# Patient Record
Sex: FEMALE | Race: WHITE | Employment: FULL TIME | URBAN - METROPOLITAN AREA
[De-identification: names, ages, dates, MRNs, and addresses within clinical notes are randomized per-mention and may not be internally consistent; named-entity substitution may affect disease eponyms.]

---

## 2018-01-08 ENCOUNTER — OP HISTORICAL/CONVERTED ENCOUNTER (OUTPATIENT)
Dept: OTHER | Age: 58
End: 2018-01-08

## 2018-02-06 ENCOUNTER — APPOINTMENT (OUTPATIENT)
Dept: MRI IMAGING | Age: 58
DRG: 446 | End: 2018-02-06
Attending: INTERNAL MEDICINE
Payer: OTHER GOVERNMENT

## 2018-02-06 ENCOUNTER — HOSPITAL ENCOUNTER (INPATIENT)
Age: 58
LOS: 1 days | Discharge: HOME | DRG: 446 | End: 2018-02-07
Attending: EMERGENCY MEDICINE | Admitting: INTERNAL MEDICINE
Payer: OTHER GOVERNMENT

## 2018-02-06 ENCOUNTER — APPOINTMENT (OUTPATIENT)
Dept: CT IMAGING | Age: 58
DRG: 446 | End: 2018-02-06
Attending: EMERGENCY MEDICINE
Payer: OTHER GOVERNMENT

## 2018-02-06 DIAGNOSIS — K80.50 CHOLEDOCHOLITHIASIS: Primary | ICD-10-CM

## 2018-02-06 LAB
ALBUMIN SERPL-MCNC: 3.5 G/DL (ref 3.5–5)
ALBUMIN/GLOB SERPL: 1.1 {RATIO} (ref 1.1–2.2)
ALP SERPL-CCNC: 70 U/L (ref 45–117)
ALT SERPL-CCNC: 21 U/L (ref 12–78)
ANION GAP SERPL CALC-SCNC: 11 MMOL/L (ref 5–15)
APPEARANCE UR: CLEAR
AST SERPL-CCNC: 27 U/L (ref 15–37)
BASOPHILS # BLD: 0.1 K/UL (ref 0–0.1)
BASOPHILS NFR BLD: 1 % (ref 0–1)
BILIRUB SERPL-MCNC: 0.3 MG/DL (ref 0.2–1)
BILIRUB UR QL: NEGATIVE
BUN SERPL-MCNC: 14 MG/DL (ref 6–20)
BUN/CREAT SERPL: 18 (ref 12–20)
CALCIUM SERPL-MCNC: 9.1 MG/DL (ref 8.5–10.1)
CHLORIDE SERPL-SCNC: 106 MMOL/L (ref 97–108)
CO2 SERPL-SCNC: 26 MMOL/L (ref 21–32)
COLOR UR: ABNORMAL
CREAT SERPL-MCNC: 0.78 MG/DL (ref 0.55–1.02)
DIFFERENTIAL METHOD BLD: ABNORMAL
EOSINOPHIL # BLD: 0.2 K/UL (ref 0–0.4)
EOSINOPHIL NFR BLD: 1 % (ref 0–7)
ERYTHROCYTE [DISTWIDTH] IN BLOOD BY AUTOMATED COUNT: 13 % (ref 11.5–14.5)
GLOBULIN SER CALC-MCNC: 3.1 G/DL (ref 2–4)
GLUCOSE SERPL-MCNC: 118 MG/DL (ref 65–100)
GLUCOSE UR STRIP.AUTO-MCNC: NEGATIVE MG/DL
HCT VFR BLD AUTO: 41.4 % (ref 35–47)
HGB BLD-MCNC: 13.6 G/DL (ref 11.5–16)
HGB UR QL STRIP: NEGATIVE
IMM GRANULOCYTES # BLD: 0.1 K/UL (ref 0–0.04)
IMM GRANULOCYTES NFR BLD AUTO: 1 % (ref 0–0.5)
KETONES UR QL STRIP.AUTO: 15 MG/DL
LEUKOCYTE ESTERASE UR QL STRIP.AUTO: NEGATIVE
LIPASE SERPL-CCNC: 262 U/L (ref 73–393)
LYMPHOCYTES # BLD: 4.8 K/UL (ref 0.8–3.5)
LYMPHOCYTES NFR BLD: 44 % (ref 12–49)
MCH RBC QN AUTO: 30.7 PG (ref 26–34)
MCHC RBC AUTO-ENTMCNC: 32.9 G/DL (ref 30–36.5)
MCV RBC AUTO: 93.5 FL (ref 80–99)
MONOCYTES # BLD: 0.8 K/UL (ref 0–1)
MONOCYTES NFR BLD: 7 % (ref 5–13)
NEUTS SEG # BLD: 5.1 K/UL (ref 1.8–8)
NEUTS SEG NFR BLD: 46 % (ref 32–75)
NITRITE UR QL STRIP.AUTO: NEGATIVE
NRBC # BLD: 0 K/UL (ref 0–0.01)
NRBC BLD-RTO: 0 PER 100 WBC
PH UR STRIP: 6 [PH] (ref 5–8)
PLATELET # BLD AUTO: 317 K/UL (ref 150–400)
PMV BLD AUTO: 9.2 FL (ref 8.9–12.9)
POTASSIUM SERPL-SCNC: 3.7 MMOL/L (ref 3.5–5.1)
PROT SERPL-MCNC: 6.6 G/DL (ref 6.4–8.2)
PROT UR STRIP-MCNC: NEGATIVE MG/DL
RBC # BLD AUTO: 4.43 M/UL (ref 3.8–5.2)
SODIUM SERPL-SCNC: 143 MMOL/L (ref 136–145)
SP GR UR REFRACTOMETRY: <1.005 (ref 1–1.03)
TROPONIN I SERPL-MCNC: <0.04 NG/ML
UR CULT HOLD, URHOLD: NORMAL
UROBILINOGEN UR QL STRIP.AUTO: 0.2 EU/DL (ref 0.2–1)
WBC # BLD AUTO: 11 K/UL (ref 3.6–11)

## 2018-02-06 PROCEDURE — 93005 ELECTROCARDIOGRAM TRACING: CPT

## 2018-02-06 PROCEDURE — 80053 COMPREHEN METABOLIC PANEL: CPT | Performed by: EMERGENCY MEDICINE

## 2018-02-06 PROCEDURE — 83690 ASSAY OF LIPASE: CPT | Performed by: EMERGENCY MEDICINE

## 2018-02-06 PROCEDURE — 85025 COMPLETE CBC W/AUTO DIFF WBC: CPT | Performed by: EMERGENCY MEDICINE

## 2018-02-06 PROCEDURE — 74177 CT ABD & PELVIS W/CONTRAST: CPT

## 2018-02-06 PROCEDURE — 65270000029 HC RM PRIVATE

## 2018-02-06 PROCEDURE — 74011250636 HC RX REV CODE- 250/636: Performed by: EMERGENCY MEDICINE

## 2018-02-06 PROCEDURE — 96374 THER/PROPH/DIAG INJ IV PUSH: CPT

## 2018-02-06 PROCEDURE — 74181 MRI ABDOMEN W/O CONTRAST: CPT

## 2018-02-06 PROCEDURE — 84484 ASSAY OF TROPONIN QUANT: CPT | Performed by: EMERGENCY MEDICINE

## 2018-02-06 PROCEDURE — 81003 URINALYSIS AUTO W/O SCOPE: CPT | Performed by: EMERGENCY MEDICINE

## 2018-02-06 PROCEDURE — 36415 COLL VENOUS BLD VENIPUNCTURE: CPT | Performed by: EMERGENCY MEDICINE

## 2018-02-06 PROCEDURE — 99284 EMERGENCY DEPT VISIT MOD MDM: CPT

## 2018-02-06 PROCEDURE — 74011250636 HC RX REV CODE- 250/636: Performed by: INTERNAL MEDICINE

## 2018-02-06 PROCEDURE — 74011250637 HC RX REV CODE- 250/637: Performed by: INTERNAL MEDICINE

## 2018-02-06 PROCEDURE — 96361 HYDRATE IV INFUSION ADD-ON: CPT

## 2018-02-06 PROCEDURE — 74011636320 HC RX REV CODE- 636/320: Performed by: RADIOLOGY

## 2018-02-06 RX ORDER — DIPHENHYDRAMINE HCL 25 MG
25 CAPSULE ORAL
Status: DISCONTINUED | OUTPATIENT
Start: 2018-02-06 | End: 2018-02-07 | Stop reason: HOSPADM

## 2018-02-06 RX ORDER — ACETAMINOPHEN 325 MG/1
650 TABLET ORAL
Status: DISCONTINUED | OUTPATIENT
Start: 2018-02-06 | End: 2018-02-07 | Stop reason: HOSPADM

## 2018-02-06 RX ORDER — ONDANSETRON 2 MG/ML
4 INJECTION INTRAMUSCULAR; INTRAVENOUS
Status: COMPLETED | OUTPATIENT
Start: 2018-02-06 | End: 2018-02-06

## 2018-02-06 RX ORDER — NALOXONE HYDROCHLORIDE 0.4 MG/ML
0.4 INJECTION, SOLUTION INTRAMUSCULAR; INTRAVENOUS; SUBCUTANEOUS AS NEEDED
Status: DISCONTINUED | OUTPATIENT
Start: 2018-02-06 | End: 2018-02-07 | Stop reason: HOSPADM

## 2018-02-06 RX ORDER — ONDANSETRON 2 MG/ML
4 INJECTION INTRAMUSCULAR; INTRAVENOUS
Status: DISCONTINUED | OUTPATIENT
Start: 2018-02-06 | End: 2018-02-07 | Stop reason: HOSPADM

## 2018-02-06 RX ORDER — URSODIOL 300 MG/1
300 CAPSULE ORAL 2 TIMES DAILY WITH MEALS
Status: DISCONTINUED | OUTPATIENT
Start: 2018-02-06 | End: 2018-02-07 | Stop reason: HOSPADM

## 2018-02-06 RX ORDER — HYDROCODONE BITARTRATE AND ACETAMINOPHEN 5; 325 MG/1; MG/1
1 TABLET ORAL
Status: DISCONTINUED | OUTPATIENT
Start: 2018-02-06 | End: 2018-02-07 | Stop reason: HOSPADM

## 2018-02-06 RX ORDER — DEXTROSE, SODIUM CHLORIDE, AND POTASSIUM CHLORIDE 5; .45; .15 G/100ML; G/100ML; G/100ML
125 INJECTION INTRAVENOUS CONTINUOUS
Status: DISCONTINUED | OUTPATIENT
Start: 2018-02-06 | End: 2018-02-07 | Stop reason: HOSPADM

## 2018-02-06 RX ADMIN — ONDANSETRON 4 MG: 2 INJECTION INTRAMUSCULAR; INTRAVENOUS at 15:31

## 2018-02-06 RX ADMIN — SODIUM CHLORIDE 1000 ML: 900 INJECTION, SOLUTION INTRAVENOUS at 15:31

## 2018-02-06 RX ADMIN — URSODIOL 300 MG: 300 CAPSULE ORAL at 18:23

## 2018-02-06 RX ADMIN — IOPAMIDOL 100 ML: 755 INJECTION, SOLUTION INTRAVENOUS at 16:37

## 2018-02-06 RX ADMIN — DEXTROSE MONOHYDRATE, SODIUM CHLORIDE, AND POTASSIUM CHLORIDE 125 ML/HR: 50; 4.5; 1.49 INJECTION, SOLUTION INTRAVENOUS at 19:20

## 2018-02-06 NOTE — ED NOTES
TRANSFER - OUT REPORT:    Verbal report given to [facility] [place], RN(name) on Mary Locks  being transferred to Room 522(unit) for routine progression of care       Report consisted of patients Situation, Background, Assessment and   Recommendations(SBAR). Information from the following report(s) SBAR, Kardex, ED Summary, Procedure Summary, Intake/Output, MAR and Recent Results was reviewed with the receiving nurse. Lines:   Peripheral IV 02/06/18 Left Antecubital (Active)   Site Assessment Clean, dry, & intact 2/6/2018  2:49 PM   Phlebitis Assessment 0 2/6/2018  2:49 PM   Infiltration Assessment 0 2/6/2018  2:49 PM   Dressing Status Clean, dry, & intact 2/6/2018  2:49 PM   Dressing Type Transparent 2/6/2018  2:49 PM        Opportunity for questions and clarification was provided.       Patient transported with:   [employer]

## 2018-02-06 NOTE — IP AVS SNAPSHOT
[address] [place] [address] 104 1007 [facility] 
[phone] Patient: Moris Nunez MRN: [id number] :1960 About your hospitalization You were admitted on:  2018 You last received care in the:  [facility]  MED SURG 2 You were discharged on:  2018 Why you were hospitalized Your primary diagnosis was:  Bile Duct Stone Follow-up Information Follow up With Details Comments Contact Info Your PCP In 2 weeks Discharge Orders None A check stanley indicates which time of day the medication should be taken. My Medications Notice You have not been prescribed any medications. Discharge Instructions HOSPITALIST DISCHARGE INSTRUCTIONS 
NAME: Moris Nunez :  1960 MRN:  [id number] Date/Time:  2018 12:51 PM 
 
ADMIT DATE: 2018 DISCHARGE DATE: 2018 DISCHARGE DIAGNOSIS: 
Gallstone MEDICATIONS: 
· It is important that you take the medication exactly as they are prescribed. · Keep your medication in the bottles provided by the pharmacist and keep a list of the medication names, dosages, and times to be taken in your wallet. · Do not take other medications without consulting your doctor. Pain Management: per above medications What to do at [facility] Recommended diet:  Low fat, Low cholesterol Recommended activity: Activity as tolerated If you experience any of the following symptoms then please call your primary care physician or return to the emergency room if you cannot get hold of your doctor: 
Fever, chills, nausea, vomiting, diarrhea, change in mentation, falling, bleeding, shortness of breath Follow Up: Follow-up Information Follow up With Details Comments Contact Info Your PCP In 2 weeks Information obtained by : 
I understand that if any problems occur once I am at home I am to contact my physician. I understand and acknowledge receipt of the instructions indicated above. Physician's or R.N.'s Signature                                                                  Date/Time Patient or Representative Signature                                                          Date/Time Tengaged Announcement We are excited to announce that we are making your provider's discharge notes available to you in Tengaged. You will see these notes when they are completed and signed by the physician that discharged you from your recent hospital stay. If you have any questions or concerns about any information you see in Tengaged, please call the Health Information Department where you were seen or reach out to your Primary Care Provider for more information about your plan of care. Introducing [facility]! [facility] introduces Tengaged patient portal. Now you can access parts of your medical record, email your doctor's office, and request medication refills online. 1. In your internet browser, go to [email or web address] 2. Click on the First Time User? Click Here link in the Sign In box. You will see the New Member Sign Up page. 3. Enter your Tengaged Access Code exactly as it appears below. You will not need to use this code after youve completed the sign-up process. If you do not sign up before the expiration date, you must request a new code. · Tengaged Access Code: [email or web address] Expires: 5/8/2018  3:32 PM 
 
4. Enter the last four digits of your Social Security Number (xxxx) and Date of Birth (mm/dd/yyyy) as indicated and click Submit.  You will be taken to the next sign-up page. 5. Create a [employer] ID. This will be your [employer] login ID and cannot be changed, so think of one that is secure and easy to remember. 6. Create a [employer] password. You can change your password at any time. 7. Enter your Password Reset Question and Answer. This can be used at a later time if you forget your password. 8. Enter your e-mail address. You will receive e-mail notification when new information is available in [address] 9. Click Sign Up. You can now view and download portions of your medical record. 10. Click the Download Summary menu link to download a portable copy of your medical information. If you have questions, please visit the Frequently Asked Questions section of the [employer] website. Remember, [employer] is NOT to be used for urgent needs. For medical emergencies, dial 911. Now available from your iPhone and Android! Unresulted Labs-Please follow up with your PCP about these lab tests Order Current Status MRI ABD WO CONT Preliminary result Providers Seen During Your Hospitalization Provider Specialty Primary office phone Mansoor Gonzalez MD Emergency Medicine [phone] Ravi Munson MD Internal Medicine [phone] Sravan Peraza MD Internal Medicine [phone] Your Primary Care Physician (PCP) Primary Care Physician Office Phone Office Fax OTHER, PHYS ** None ** ** None ** You are allergic to the following No active allergies Recent Documentation Height Weight Breastfeeding? BMI OB Status Smoking Status 1.549 m 53.5 kg No 22.3 kg/m2 Postmenopausal Current Some Day Smoker Emergency Contacts Name Discharge Info Relation Home Work Mobile [employer] [place] CAREGIVER [3] Friend [5] [phone] Patient Belongings The following personal items are in your possession at time of discharge: Dental Appliances: None  Visual Aid: Glasses      Home Medications: None   Jewelry: Earrings, Ring, Necklace  Clothing: Footwear, Pants, Shirt, Sweater, Undergarments    Other Valuables: Courtney Holt Please provide this summary of care documentation to your next provider. Signatures-by signing, you are acknowledging that this After Visit Summary has been reviewed with you and you have received a copy. Patient Signature:  ____________________________________________________________ Date:  ____________________________________________________________  
  
Anh Rice Provider Signature:  ____________________________________________________________ Date:  ____________________________________________________________

## 2018-02-06 NOTE — PROGRESS NOTES
[facility]: MED RECONCILIATION    Comments/Recommendations:     Medication(s) ADDED to PTA list:  1. None    Medication(s) REMOVED from PTA list:  1. Estrogen, conjugated (Premarin) 0.45 mg daily- stopped years ago  2. Lortab prn    Medication(s) ADJUSTED on PTA list:  1. none      Information obtained from: Patient      Allergies: Review of patient's allergies indicates no known allergies.     Prior to Admission Medications:     None        SHIRA Ahn   Contact: [phone]

## 2018-02-06 NOTE — H&P
[facility]    Hospitalist Admission Note      NAME:  Connie Roberts   :   1960   MRN:  [id number]     PCP:  Jessi Ayers MD     Date/Time:  2018 5:39 PM          Subjective:     CHIEF COMPLAINT:  Abdominal pain    HISTORY OF PRESENT ILLNESS:     Ms. Zeeshan Zhang is a 61 y.o.  female who presented to the Emergency Department complaining of abdominal pain. Occurred for two days. Post prandial.  Similar to prior pain from gallbaldder, which was previously removed. ER workup with stones found in CBD and residual cystic ducts. She is on estrogens. We will admit her for management. Past Medical History:   Diagnosis Date    Liver disease 3/2012    lesion on liver/ just watching right now    Nausea & vomiting     severe         Past Surgical History:   Procedure Laterality Date    HX  SECTION  , ,     HX HYSTERECTOMY      uterus and other ovary    HX OOPHORECTOMY  1998     left    HX OTHER SURGICAL      tummy tuck       Social History   Substance Use Topics    Smoking status: Current Some Day Smoker     Packs/day: 0.50     Years: 30.00    Smokeless tobacco: Never Used    Alcohol use Yes      Comment: once every 2 to 3 months        History reviewed. No pertinent family history of gallstones. No Known Allergies     Prior to Admission medications    Medication Sig Start Date End Date Taking? Authorizing Provider   HYDROcodone-acetaminophen (LORTAB) 5-500 mg per tablet Take 1-2 Tabs by mouth. Romero Zabala MD   estrogens, conjugated, (PREMARIN) 0.45 mg tablet Take 0.45 mg by mouth daily.       Historical Provider       Review of Systems:  (bold if positive, if negative)    Gen:  Eyes:  ENT:  CVS:  Pulm:  GI:  Abdominal pain, nausea  :    MS:  Skin:  Psych:  Endo:    Hem:  Renal:    Neuro:        Objective:      VITALS:    Vital signs reviewed; most recent are:    Visit Vitals    BP 99/51 (BP 1 Location: Right arm, BP Patient Position: At rest)    Pulse 69    Temp 97.3 °F (36.3 °C)    Resp 18    Ht 5' 1\" (1.549 m)    Wt 53.5 kg (118 lb)    SpO2 100%    BMI 22.3 kg/m2     SpO2 Readings from Last 6 Encounters:   02/06/18 100%   05/11/12 99%        No intake or output data in the 24 hours ending 02/06/18 1517     Exam:     Physical Exam:    Gen:  Well-developed, well-nourished, in no acute distress  HEENT:  Pink conjunctivae, PERRL, hearing intact to voice, moist mucous membranes  Neck:  Supple, without masses, thyroid non-tender  Resp:  No accessory muscle use, clear breath sounds without wheezes rales or rhonchi  Card:  No murmurs, normal S1, S2 without thrills, bruits or peripheral edema  Abd:  Soft, minimally tender, non-distended, normoactive bowel sounds are present, no mass  Lymph:  No cervical or inguinal adenopathy  Musc:  No cyanosis or clubbing  Skin:  No rashes or ulcers, skin turgor is good  Neuro:  Cranial nerves are grossly intact, no focal motor weakness, follows commands appropriately  Psych:  Good insight, oriented to person, place and time, alert     Labs:    Recent Labs      02/06/18   1453   WBC  11.0   HGB  13.6   HCT  41.4   PLT  317     Recent Labs      02/06/18   1453   NA  143   K  3.7   CL  106   CO2  26   GLU  118*   BUN  14   CREA  0.78   CA  9.1   ALB  3.5   TBILI  0.3   SGOT  27   ALT  21     No results found for: GLUCPOC  No results for input(s): PH, PCO2, PO2, HCO3, FIO2 in the last 72 hours. No results for input(s): INR in the last 72 hours. No lab exists for component: INREXT  All Micro Results     Procedure Component Value Units Date/Time    URINE CULTURE HOLD SAMPLE [[id number]] Collected:  02/06/18 1711    Order Status:  Completed Specimen:  Serum Updated:  02/06/18 1730     Urine culture hold         URINE ON HOLD IN MICROBIOLOGY DEPT FOR 3 DAYS. IF UNPRESERVED URINE IS SUBMITTED, IT CANNOT BE USED FOR ADDITIONAL TESTING AFTER 24 HRS, RECOLLECTION WILL BE REQUIRED.           I have reviewed previous records       Assessment and Plan:      Bile duct stone - POA. Likely has high propensity for stones. Stop estrogens. Start ursodiol. Check MRCP, and then GI can determine whether to do ERCP for removal in AM.  NPO at midnight. Clears until then. Pain and nausea control. IVF.      Telemetry reviewed:   normal sinus rhythm    Risk of deterioration: low      Total time spent with patient: [address] discussed with: Patient, Family, Nursing Staff and >50% of time spent in counseling and coordination of care    Discussed:  Care Plan       ___________________________________________________    Attending Physician: Aries Spence MD

## 2018-02-06 NOTE — ED TRIAGE NOTES
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 Started with sharp abd pain upper abd, radiating to the back, with vomiting. Had episode like this yesterday, and vomited, then it resolved.

## 2018-02-06 NOTE — ED PROVIDER NOTES
Patient is a 61 y.o. female presenting with abdominal pain. Abdominal Pain    Associated symptoms include nausea, vomiting and back pain. Pertinent negatives include no fever, no diarrhea, no constipation, no dysuria, no hematuria and no headaches. 60 yo WF presents with epigastric abdominal pain, radiating to her back, onset just prior to arrival in ED. Pain 10/10, sharp, shooting. Associated with nausea and vomiting, nonbloody, nonbilious. Felt clammy. Had episode yesterday, milder pain yesterday, vomited x1 then symptoms resolved. Denies fever, chills, diarrhea, constipation. Last BM this morning, normal, no bloody or black stools. Denies dysuria, hematuria. Pt had her urethra stretched in January. No known sick contacts. Past Medical History:   Diagnosis Date    Liver disease 3/2012    lesion on liver/ just watching right now    Nausea & vomiting     severe        Past Surgical History:   Procedure Laterality Date    HX  SECTION  , , 80    HX HYSTERECTOMY      uterus and other ovary    HX OOPHORECTOMY       left    HX OTHER SURGICAL  2007    tummy tuck         History reviewed. No pertinent family history. Social History     Social History    Marital status:      Spouse name: N/A    Number of children: N/A    Years of education: N/A     Occupational History    Not on file. Social History Main Topics    Smoking status: Current Some Day Smoker     Packs/day: 0.50     Years: 30.00    Smokeless tobacco: Not on file    Alcohol use Yes      Comment: once every 2 to 3 months    Drug use: No    Sexual activity: Not on file     Other Topics Concern    Not on file     Social History Narrative         ALLERGIES: Review of patient's allergies indicates no known allergies. Review of Systems   Constitutional: Positive for diaphoresis. Negative for chills and fever. Respiratory: Negative for cough and shortness of breath.     Gastrointestinal: Positive for abdominal pain, nausea and vomiting. Negative for constipation and diarrhea. Genitourinary: Negative for dysuria and hematuria. Musculoskeletal: Positive for back pain. Skin: Negative for rash. Neurological: Negative for headaches. All other systems reviewed and are negative.       Vitals:    02/06/18 1429   BP: 138/64   Pulse: 64   Resp: 18   Temp: 97.3 °F (36.3 °C)   SpO2: 100%   Weight: 53.5 kg (118 lb)   Height: 5' 1\" (1.549 m)            Physical Exam   Physical Examination: General appearance - alert, well appearing, and in no distress, oriented to person, place, and time and normal appearing weight  Eyes - pupils equal and reactive, extraocular eye movements intact  Neck - supple, no significant adenopathy  Chest - clear to auscultation, no wheezes, rales or rhonchi, symmetric air entry  Heart - normal rate, regular rhythm, normal S1, S2, no murmurs, rubs, clicks or gallops  Abdomen - soft, mild tenderness to RUQ, no rebound/guarding/peritoneal signs, nondistended, no masses or organomegaly  Back exam - full range of motion, no tenderness, palpable spasm or pain on motion  Neurological - alert, oriented, normal speech, no focal findings or movement disorder noted  Musculoskeletal - no joint tenderness, deformity or swelling  Extremities - peripheral pulses normal, no pedal edema, no clubbing or cyanosis  Skin - normal coloration and turgor, no rashes, no suspicious skin lesions noted  MDM  Number of Diagnoses or Management Options     Amount and/or Complexity of Data Reviewed  Clinical lab tests: ordered and reviewed  Tests in the radiology section of CPT®: ordered and reviewed  Decide to obtain previous medical records or to obtain history from someone other than the patient: yes  Obtain history from someone other than the patient: yes (family)  Review and summarize past medical records: yes  Discuss the patient with other providers: yes (GI, hospitalist)  Independent visualization of images, tracings, or specimens: yes    Patient Progress  Patient progress: improved        ED Course       Procedures  5:23 PM  Discussed with Dr. London Lombardo, GI. Recommends admission for abd pain and MRCP to eval for possible biliary duct stones. 5:27 PM  Discussed with Dr. Alan Ventura, hospitalist. Will see and admit.    5:27 PM  Updated pt and family on test results and plan for admission. EKG interpretation: (Preliminary)  Rhythm: normal sinus rhythm; and regular .  Rate (approx.): 60; Axis: normal; UT interval: normal; QRS interval: normal ; ST/T wave: normal;

## 2018-02-06 NOTE — IP AVS SNAPSHOT
Summary of Care Report The Summary of Care report has been created to help improve care coordination. Users with access to [employer] or [address] (Web-based application) may access additional patient information including the Discharge Summary. If you are not currently a [address] user and need more information, please call the number listed below in the [place] [address] section and ask to be connected with Medical Records. Facility Information Name Address Phone [address]  [address] [id number] [phone] Patient Information Patient Name Sex  Dennis Covarrubias ([id number]) Female 1960 Discharge Information Admitting Provider Service Area Unit Shena Mayorga MD / Laya  [phone] Discharge Provider Discharge Date/Time Discharge Disposition Destination (none) 2018 (Pending) AHR (none) Patient Language Language ENGLISH [13] Hospital Problems as of 2018  Reviewed: 2018  5:36 PM by Shena Mayorga MD  
  
  
  
 Class Noted - Resolved Last Modified POA Active Problems * (Principal)Bile duct stone  2018 - Present 2018 by Shena Mayorga MD Yes Entered by Shena Mayorga MD  
  
Non-Hospital Problems as of 2018  Reviewed: 2018  5:36 PM by Shena Mayorga MD  
 None You are allergic to the following No active allergies Current Discharge Medication List  
  
Notice You have not been prescribed any medications. Follow-up Information Follow up With Details Comments Contact Info Your PCP In 2 weeks Discharge Instructions HOSPITALIST DISCHARGE INSTRUCTIONS 
NAME: Corey Lozano :  1960 MRN:  [id number] Date/Time:  2018 12:51 PM 
 
 ADMIT DATE: 2/6/2018 DISCHARGE DATE: 2/7/2018 DISCHARGE DIAGNOSIS: 
Gallstone MEDICATIONS: 
· It is important that you take the medication exactly as they are prescribed. · Keep your medication in the bottles provided by the pharmacist and keep a list of the medication names, dosages, and times to be taken in your wallet. · Do not take other medications without consulting your doctor. Pain Management: per above medications What to do at [facility] Recommended diet:  Low fat, Low cholesterol Recommended activity: Activity as tolerated If you experience any of the following symptoms then please call your primary care physician or return to the emergency room if you cannot get hold of your doctor: 
Fever, chills, nausea, vomiting, diarrhea, change in mentation, falling, bleeding, shortness of breath Follow Up: Follow-up Information Follow up With Details Comments Contact Info Your PCP In 2 weeks Information obtained by : 
I understand that if any problems occur once I am at home I am to contact my physician. I understand and acknowledge receipt of the instructions indicated above. Physician's or R.N.'s Signature                                                                  Date/Time Patient or Representative Signature                                                          Date/Time Chart Review Routing History No Routing History on File

## 2018-02-07 VITALS
DIASTOLIC BLOOD PRESSURE: 63 MMHG | HEART RATE: 57 BPM | TEMPERATURE: 98.6 F | SYSTOLIC BLOOD PRESSURE: 98 MMHG | OXYGEN SATURATION: 96 % | HEIGHT: 61 IN | WEIGHT: 118 LBS | BODY MASS INDEX: 22.28 KG/M2 | RESPIRATION RATE: 17 BRPM

## 2018-02-07 LAB
ALBUMIN SERPL-MCNC: 2.7 G/DL (ref 3.5–5)
ALBUMIN/GLOB SERPL: 1 {RATIO} (ref 1.1–2.2)
ALP SERPL-CCNC: 68 U/L (ref 45–117)
ALT SERPL-CCNC: 70 U/L (ref 12–78)
ANION GAP SERPL CALC-SCNC: 8 MMOL/L (ref 5–15)
AST SERPL-CCNC: 63 U/L (ref 15–37)
ATRIAL RATE: 60 BPM
BILIRUB SERPL-MCNC: 0.3 MG/DL (ref 0.2–1)
BUN SERPL-MCNC: 6 MG/DL (ref 6–20)
BUN/CREAT SERPL: 11 (ref 12–20)
CALCIUM SERPL-MCNC: 8.1 MG/DL (ref 8.5–10.1)
CALCULATED P AXIS, ECG09: 65 DEGREES
CALCULATED R AXIS, ECG10: 54 DEGREES
CALCULATED T AXIS, ECG11: 70 DEGREES
CHLORIDE SERPL-SCNC: 111 MMOL/L (ref 97–108)
CO2 SERPL-SCNC: 25 MMOL/L (ref 21–32)
CREAT SERPL-MCNC: 0.53 MG/DL (ref 0.55–1.02)
DIAGNOSIS, 93000: NORMAL
ERYTHROCYTE [DISTWIDTH] IN BLOOD BY AUTOMATED COUNT: 13.2 % (ref 11.5–14.5)
GLOBULIN SER CALC-MCNC: 2.6 G/DL (ref 2–4)
GLUCOSE SERPL-MCNC: 101 MG/DL (ref 65–100)
HCT VFR BLD AUTO: 35.6 % (ref 35–47)
HGB BLD-MCNC: 11.6 G/DL (ref 11.5–16)
MAGNESIUM SERPL-MCNC: 1.8 MG/DL (ref 1.6–2.4)
MCH RBC QN AUTO: 30.9 PG (ref 26–34)
MCHC RBC AUTO-ENTMCNC: 32.6 G/DL (ref 30–36.5)
MCV RBC AUTO: 94.9 FL (ref 80–99)
NRBC # BLD: 0 K/UL (ref 0–0.01)
NRBC BLD-RTO: 0 PER 100 WBC
P-R INTERVAL, ECG05: 124 MS
PHOSPHATE SERPL-MCNC: 3.3 MG/DL (ref 2.6–4.7)
PLATELET # BLD AUTO: 257 K/UL (ref 150–400)
PMV BLD AUTO: 9.4 FL (ref 8.9–12.9)
POTASSIUM SERPL-SCNC: 3.7 MMOL/L (ref 3.5–5.1)
PROT SERPL-MCNC: 5.3 G/DL (ref 6.4–8.2)
Q-T INTERVAL, ECG07: 422 MS
QRS DURATION, ECG06: 78 MS
QTC CALCULATION (BEZET), ECG08: 422 MS
RBC # BLD AUTO: 3.75 M/UL (ref 3.8–5.2)
SODIUM SERPL-SCNC: 144 MMOL/L (ref 136–145)
VENTRICULAR RATE, ECG03: 60 BPM
WBC # BLD AUTO: 8.7 K/UL (ref 3.6–11)

## 2018-02-07 PROCEDURE — 84100 ASSAY OF PHOSPHORUS: CPT | Performed by: INTERNAL MEDICINE

## 2018-02-07 PROCEDURE — 83735 ASSAY OF MAGNESIUM: CPT | Performed by: INTERNAL MEDICINE

## 2018-02-07 PROCEDURE — 36415 COLL VENOUS BLD VENIPUNCTURE: CPT | Performed by: INTERNAL MEDICINE

## 2018-02-07 PROCEDURE — 80053 COMPREHEN METABOLIC PANEL: CPT | Performed by: INTERNAL MEDICINE

## 2018-02-07 PROCEDURE — 85027 COMPLETE CBC AUTOMATED: CPT | Performed by: INTERNAL MEDICINE

## 2018-02-07 NOTE — PROGRESS NOTES
Bedside shift change report given to Rogerio Long RN (oncoming nurse) by Ramirez Mcgrath RN (offgoing nurse). Report included the following information SBAR, Kardex, Intake/Output and Recent Results.

## 2018-02-07 NOTE — PROGRESS NOTES
Gastrointestinal Progress Note    2/7/2018    Admit Date: 2/6/2018    Subjective:     New Complaints Today:  No - resting in bed. States the only time she experiences pain or vomiting is with eating. Last night had some orange juice that caused some increase in pain but no further vomiting. Currently without symptoms. Reviewed MRCP films with radiology [facility] In dept; no stones visible on MRI study    Current Facility-Administered Medications   Medication Dose Route Frequency    naloxone (NARCAN) injection 0.4 mg  0.4 mg IntraVENous PRN    dextrose 5% - 0.45% NaCl with KCl 20 mEq/L infusion  125 mL/hr IntraVENous CONTINUOUS    acetaminophen (TYLENOL) tablet 650 mg  650 mg Oral Q4H PRN    HYDROcodone-acetaminophen (NORCO) 5-325 mg per tablet 1 Tab  1 Tab Oral Q4H PRN    diphenhydrAMINE (BENADRYL) capsule 25 mg  25 mg Oral Q4H PRN    ondansetron (ZOFRAN) injection 4 mg  4 mg IntraVENous Q4H PRN    ursodiol (ACTIGALL) capsule 300 mg  300 mg Oral BID WITH MEALS        Objective:     Blood pressure 99/51, pulse 64, temperature 98.6 °F (37 °C), resp. rate 17, height 5' 1\" (1.549 m), weight 53.5 kg (118 lb), SpO2 96 %, not currently breastfeeding.          02/05 1901 - 02/07 0700  In: 466.7 [I.V.:466.7]  Out: -     EXAM:  GENERAL: alert, cooperative, no distress, HEART: regular rate and rhythm, S1, S2 normal, no murmur, click, rub or gallop, LUNGS: chest clear, no wheezing, rales, normal symmetric air entry, ABDOMEN: bowel sounds present, soft, nondistended, nontender EXTREMITY: extremities normal, atraumatic, no cyanosis or edema      Data Review    Recent Results (from the past 24 hour(s))   CBC WITH AUTOMATED DIFF    Collection Time: 02/06/18  2:53 PM   Result Value Ref Range    WBC 11.0 3.6 - 11.0 K/uL    RBC 4.43 3.80 - 5.20 M/uL    HGB 13.6 11.5 - 16.0 g/dL    HCT 41.4 35.0 - 47.0 %    MCV 93.5 80.0 - 99.0 FL    MCH 30.7 26.0 - 34.0 PG    MCHC 32.9 30.0 - 36.5 g/dL    RDW 13.0 11.5 - 14.5 %    PLATELET 317 150 - 400 K/uL    MPV 9.2 8.9 - 12.9 FL    NRBC 0.0 0  WBC    ABSOLUTE NRBC 0.00 0.00 - 0.01 K/uL    NEUTROPHILS 46 32 - 75 %    LYMPHOCYTES 44 12 - 49 %    MONOCYTES 7 5 - 13 %    EOSINOPHILS 1 0 - 7 %    BASOPHILS 1 0 - 1 %    IMMATURE GRANULOCYTES 1 (H) 0.0 - 0.5 %    ABS. NEUTROPHILS 5.1 1.8 - 8.0 K/UL    ABS. LYMPHOCYTES 4.8 (H) 0.8 - 3.5 K/UL    ABS. MONOCYTES 0.8 0.0 - 1.0 K/UL    ABS. EOSINOPHILS 0.2 0.0 - 0.4 K/UL    ABS. BASOPHILS 0.1 0.0 - 0.1 K/UL    ABS. IMM. GRANS. 0.1 (H) 0.00 - 0.04 K/UL    DF AUTOMATED     METABOLIC PANEL, COMPREHENSIVE    Collection Time: 02/06/18  2:53 PM   Result Value Ref Range    Sodium 143 136 - 145 mmol/L    Potassium 3.7 3.5 - 5.1 mmol/L    Chloride 106 97 - 108 mmol/L    CO2 26 21 - 32 mmol/L    Anion gap 11 5 - 15 mmol/L    Glucose 118 (H) 65 - 100 mg/dL    BUN 14 6 - 20 MG/DL    Creatinine 0.78 0.55 - 1.02 MG/DL    BUN/Creatinine ratio 18 12 - 20      GFR est AA >60 >60 ml/min/1.73m2    GFR est non-AA >60 >60 ml/min/1.73m2    Calcium 9.1 8.5 - 10.1 MG/DL    Bilirubin, total 0.3 0.2 - 1.0 MG/DL    ALT (SGPT) 21 12 - 78 U/L    AST (SGOT) 27 15 - 37 U/L    Alk.  phosphatase 70 45 - 117 U/L    Protein, total 6.6 6.4 - 8.2 g/dL    Albumin 3.5 3.5 - 5.0 g/dL    Globulin 3.1 2.0 - 4.0 g/dL    A-G Ratio 1.1 1.1 - 2.2     LIPASE    Collection Time: 02/06/18  2:53 PM   Result Value Ref Range    Lipase 262 73 - 393 U/L   TROPONIN I    Collection Time: 02/06/18  2:53 PM   Result Value Ref Range    Troponin-I, Qt. <0.04 <0.05 ng/mL   URINALYSIS W/ RFLX MICROSCOPIC    Collection Time: 02/06/18  5:11 PM   Result Value Ref Range    Color YELLOW/STRAW      Appearance CLEAR CLEAR      Specific gravity <1.005 1.003 - 1.030    pH (UA) 6.0 5.0 - 8.0      Protein NEGATIVE  NEG mg/dL    Glucose NEGATIVE  NEG mg/dL    Ketone 15 (A) NEG mg/dL    Bilirubin NEGATIVE  NEG      Blood NEGATIVE  NEG      Urobilinogen 0.2 0.2 - 1.0 EU/dL    Nitrites NEGATIVE  NEG      Leukocyte Esterase NEGATIVE  NEG     URINE CULTURE HOLD SAMPLE    Collection Time: 02/06/18  5:11 PM   Result Value Ref Range    Urine culture hold        URINE ON HOLD IN MICROBIOLOGY DEPT FOR 3 DAYS. IF UNPRESERVED URINE IS SUBMITTED, IT CANNOT BE USED FOR ADDITIONAL TESTING AFTER 24 HRS, RECOLLECTION WILL BE REQUIRED. CBC W/O DIFF    Collection Time: 02/07/18  5:11 AM   Result Value Ref Range    WBC 8.7 3.6 - 11.0 K/uL    RBC 3.75 (L) 3.80 - 5.20 M/uL    HGB 11.6 11.5 - 16.0 g/dL    HCT 35.6 35.0 - 47.0 %    MCV 94.9 80.0 - 99.0 FL    MCH 30.9 26.0 - 34.0 PG    MCHC 32.6 30.0 - 36.5 g/dL    RDW 13.2 11.5 - 14.5 %    PLATELET 569 810 - 910 K/uL    MPV 9.4 8.9 - 12.9 FL    NRBC 0.0 0  WBC    ABSOLUTE NRBC 0.00 0.00 - 0.01 K/uL   MAGNESIUM    Collection Time: 02/07/18  5:11 AM   Result Value Ref Range    Magnesium 1.8 1.6 - 2.4 mg/dL   METABOLIC PANEL, COMPREHENSIVE    Collection Time: 02/07/18  5:11 AM   Result Value Ref Range    Sodium 144 136 - 145 mmol/L    Potassium 3.7 3.5 - 5.1 mmol/L    Chloride 111 (H) 97 - 108 mmol/L    CO2 25 21 - 32 mmol/L    Anion gap 8 5 - 15 mmol/L    Glucose 101 (H) 65 - 100 mg/dL    BUN 6 6 - 20 MG/DL    Creatinine 0.53 (L) 0.55 - 1.02 MG/DL    BUN/Creatinine ratio 11 (L) 12 - 20      GFR est AA >60 >60 ml/min/1.73m2    GFR est non-AA >60 >60 ml/min/1.73m2    Calcium 8.1 (L) 8.5 - 10.1 MG/DL    Bilirubin, total 0.3 0.2 - 1.0 MG/DL    ALT (SGPT) 70 12 - 78 U/L    AST (SGOT) 63 (H) 15 - 37 U/L    Alk. phosphatase 68 45 - 117 U/L    Protein, total 5.3 (L) 6.4 - 8.2 g/dL    Albumin 2.7 (L) 3.5 - 5.0 g/dL    Globulin 2.6 2.0 - 4.0 g/dL    A-G Ratio 1.0 (L) 1.1 - 2.2     PHOSPHORUS    Collection Time: 02/07/18  5:11 AM   Result Value Ref Range    Phosphorus 3.3 2.6 - 4.7 MG/DL       Assessment:     Principal Problem:    Pain, vomiting indeterminate origin; at the moment can not verify stone as etiology current complaints    Plan:     1.   Advance diet; if tolerated have no objection to discharge    Mag Bustamante  02/07/18  9:31 AM  I have interviewed and examined patient with revision to note above and formulation care plan    Le Ruano M.D.

## 2018-02-07 NOTE — DISCHARGE INSTRUCTIONS
HOSPITALIST DISCHARGE INSTRUCTIONS  NAME: Magen Shetty   :  1960   MRN:  [id number]     Date/Time:  2018 12:51 PM    ADMIT DATE: 2018     DISCHARGE DATE: 2018     DISCHARGE DIAGNOSIS:  Gallstone    MEDICATIONS:  · It is important that you take the medication exactly as they are prescribed. · Keep your medication in the bottles provided by the pharmacist and keep a list of the medication names, dosages, and times to be taken in your wallet. · Do not take other medications without consulting your doctor. Pain Management: per above medications    What to do at Home    Recommended diet:  Low fat, Low cholesterol    Recommended activity: Activity as tolerated    If you experience any of the following symptoms then please call your primary care physician or return to the emergency room if you cannot get hold of your doctor:  Fever, chills, nausea, vomiting, diarrhea, change in mentation, falling, bleeding, shortness of breath    Follow Up: Follow-up Information     Follow up With Details Comments Contact Info    Your PCP In 2 weeks              Information obtained by :  I understand that if any problems occur once I am at home I am to contact my physician. I understand and acknowledge receipt of the instructions indicated above.                                                                                                                                            Physician's or R.N.'s Signature                                                                  Date/Time                                                                                                                                              Patient or Representative Signature                                                          Date/Time

## 2018-02-07 NOTE — CONSULTS
DORETHA Archer MD  [phone] office     Gastroenterology Consultation Note      Admit Date: 2018  Consult Date: 2018   I greatly appreciate your asking me to see Liban Lopez, thank you very much for the opportunity to participate in her care. Narrative Assessment and Plan   · 61year old female who presented with RUQ pain which has now resolved. CT shows possible cystic duct and CBD stone, but her LFT's are normal. She is afebrile, and her abdominal exam is benign. Will await findings of MRCP and then consider whether she would benefit from an ERCP. We should repeat her LFT's in the morning. GI will follow. Subjective:     Chief Complaint: Abdominal pain    History of Present Illness:     Ms. Alicja Murphy is a 61year old female who presented with the acute onset of RUQ pain with radiation to the back earlier today. She had a similar episode two days ago, and she may have had another episode a few months ago. She is s/p cholecystectomy. On presentation, her LFT's were normal. A CT commented on possible stones in cystic and common bile duct. She has been afebrile. This evening her pain has resolved and she otherwise feels well. No itching or jaundice. Alecia Fernández MD    Past Medical History:   Diagnosis Date    Liver disease 3/2012    lesion on liver/ just watching right now    Nausea & vomiting     severe         Past Surgical History:   Procedure Laterality Date    HX  SECTION  , ,     HX CHOLECYSTECTOMY      HX HYSTERECTOMY      uterus and other ovary    HX OOPHORECTOMY       left    HX OTHER SURGICAL      tummy tuck       Social History   Substance Use Topics    Smoking status: Current Some Day Smoker     Packs/day: 0.50     Years: 30.00    Smokeless tobacco: Never Used    Alcohol use Yes      Comment: once every 2 to 3 months        History reviewed. No pertinent family history.      No Known Allergies Home Medications:  None       Hospital Medications:  Current Facility-Administered Medications   Medication Dose Route Frequency    naloxone (NARCAN) injection 0.4 mg  0.4 mg IntraVENous PRN    dextrose 5% - 0.45% NaCl with KCl 20 mEq/L infusion  125 mL/hr IntraVENous CONTINUOUS    acetaminophen (TYLENOL) tablet 650 mg  650 mg Oral Q4H PRN    HYDROcodone-acetaminophen (NORCO) 5-325 mg per tablet 1 Tab  1 Tab Oral Q4H PRN    diphenhydrAMINE (BENADRYL) capsule 25 mg  25 mg Oral Q4H PRN    ondansetron (ZOFRAN) injection 4 mg  4 mg IntraVENous Q4H PRN    ursodiol (ACTIGALL) capsule 300 mg  300 mg Oral BID WITH MEALS       Review of Systems:  Full ROS was done and was negative except as noted in the HPI. Objective:     Physical Exam:  Visit Vitals    /56    Pulse 69    Temp 97.3 °F (36.3 °C)    Resp 18    Ht 5' 1\" (1.549 m)    Wt 53.5 kg (118 lb)    SpO2 99%    BMI 22.3 kg/m2     SpO2 Readings from Last 6 Encounters:   02/06/18 99%   05/11/12 99%          Intake/Output Summary (Last 24 hours) at 02/06/18 2001  Last data filed at 02/06/18 1945   Gross per 24 hour   Intake            52.08 ml   Output                0 ml   Net            52.08 ml      General: no distress, comfortable  Skin:  No rash or jaundice  HEENT: Pupils equal, sclera anicteric  Cardiovascular: Regular, well perfused  Respiratory:  Clear bilaterally, normal respiratory effort  GI:  Abdomen soft, nondistended, nontender, no rebound or guarding. Musculoskeletal:  No skeletal deformity nor acute arthritis noted.   Neurological:  Motor and sensory function intact in upper extremities  Psychiatric:  Normal affect, memory intact, appears to have insight into current illness    Laboratory:    Recent Results (from the past 24 hour(s))   CBC WITH AUTOMATED DIFF    Collection Time: 02/06/18  2:53 PM   Result Value Ref Range    WBC 11.0 3.6 - 11.0 K/uL    RBC 4.43 3.80 - 5.20 M/uL    HGB 13.6 11.5 - 16.0 g/dL    HCT 41.4 35.0 - 47.0 %    MCV 93.5 80.0 - 99.0 FL    MCH 30.7 26.0 - 34.0 PG    MCHC 32.9 30.0 - 36.5 g/dL    RDW 13.0 11.5 - 14.5 %    PLATELET 272 968 - 289 K/uL    MPV 9.2 8.9 - 12.9 FL    NRBC 0.0 0  WBC    ABSOLUTE NRBC 0.00 0.00 - 0.01 K/uL    NEUTROPHILS 46 32 - 75 %    LYMPHOCYTES 44 12 - 49 %    MONOCYTES 7 5 - 13 %    EOSINOPHILS 1 0 - 7 %    BASOPHILS 1 0 - 1 %    IMMATURE GRANULOCYTES 1 (H) 0.0 - 0.5 %    ABS. NEUTROPHILS 5.1 1.8 - 8.0 K/UL    ABS. LYMPHOCYTES 4.8 (H) 0.8 - 3.5 K/UL    ABS. MONOCYTES 0.8 0.0 - 1.0 K/UL    ABS. EOSINOPHILS 0.2 0.0 - 0.4 K/UL    ABS. BASOPHILS 0.1 0.0 - 0.1 K/UL    ABS. IMM. GRANS. 0.1 (H) 0.00 - 0.04 K/UL    DF AUTOMATED     METABOLIC PANEL, COMPREHENSIVE    Collection Time: 02/06/18  2:53 PM   Result Value Ref Range    Sodium 143 136 - 145 mmol/L    Potassium 3.7 3.5 - 5.1 mmol/L    Chloride 106 97 - 108 mmol/L    CO2 26 21 - 32 mmol/L    Anion gap 11 5 - 15 mmol/L    Glucose 118 (H) 65 - 100 mg/dL    BUN 14 6 - 20 MG/DL    Creatinine 0.78 0.55 - 1.02 MG/DL    BUN/Creatinine ratio 18 12 - 20      GFR est AA >60 >60 ml/min/1.73m2    GFR est non-AA >60 >60 ml/min/1.73m2    Calcium 9.1 8.5 - 10.1 MG/DL    Bilirubin, total 0.3 0.2 - 1.0 MG/DL    ALT (SGPT) 21 12 - 78 U/L    AST (SGOT) 27 15 - 37 U/L    Alk.  phosphatase 70 45 - 117 U/L    Protein, total 6.6 6.4 - 8.2 g/dL    Albumin 3.5 3.5 - 5.0 g/dL    Globulin 3.1 2.0 - 4.0 g/dL    A-G Ratio 1.1 1.1 - 2.2     LIPASE    Collection Time: 02/06/18  2:53 PM   Result Value Ref Range    Lipase 262 73 - 393 U/L   TROPONIN I    Collection Time: 02/06/18  2:53 PM   Result Value Ref Range    Troponin-I, Qt. <0.04 <0.05 ng/mL   URINALYSIS W/ RFLX MICROSCOPIC    Collection Time: 02/06/18  5:11 PM   Result Value Ref Range    Color YELLOW/STRAW      Appearance CLEAR CLEAR      Specific gravity <1.005 1.003 - 1.030    pH (UA) 6.0 5.0 - 8.0      Protein NEGATIVE  NEG mg/dL    Glucose NEGATIVE  NEG mg/dL    Ketone 15 (A) NEG mg/dL    Bilirubin NEGATIVE  NEG      Blood NEGATIVE  NEG      Urobilinogen 0.2 0.2 - 1.0 EU/dL    Nitrites NEGATIVE  NEG      Leukocyte Esterase NEGATIVE  NEG     URINE CULTURE HOLD SAMPLE    Collection Time: 02/06/18  5:11 PM   Result Value Ref Range    Urine culture hold        URINE ON HOLD IN MICROBIOLOGY DEPT FOR 3 DAYS. IF UNPRESERVED URINE IS SUBMITTED, IT CANNOT BE USED FOR ADDITIONAL TESTING AFTER 24 HRS, RECOLLECTION WILL BE REQUIRED. Assessment/Plan:     Principal Problem:    Bile duct stone (2/6/2018)         See above narrative for full detail.

## 2018-02-07 NOTE — DISCHARGE SUMMARY
Physician Discharge Summary     Patient ID:  Connie Roberts  [id number]  64 y.o.  1960    Admit date: 2/6/2018    Discharge date: 2/7/2018    Admission Diagnoses: Bile duct stone    Discharge Diagnoses:  Principal Diagnosis Bile duct stone                                            Principal Problem:    Bile duct stone (2/6/2018)         Resolved Problems:  Problem List as of 2/7/2018  Date Reviewed: 2/6/2018          Codes Class Noted - Resolved    * (Principal)Bile duct stone ICD-10-CM: K80.50  ICD-9-CM: 574.50  2/6/2018 - Present                Hospital Course:   Ms. Zeeshan Zhang was admitted to the Hospitalist Service on the 5th floor for treatment of probable intraductal biliary stone. She was treated supportively and symptomatically. Initial CT showed probable intraductal stone. Her pain resolved and MRCP done after that showed no stones. It appears she passed the stone on her own. She tolerated a solid diet prior to discharge. She was discharged home on 2/7/2018 in improved condition.         PCP: Alecia Fernández, MD    Consults: GI    Discharge Exam:  Visit Vitals    BP 98/63 (BP 1 Location: Right arm, BP Patient Position: At rest)    Pulse (!) 57    Temp 98.6 °F (37 °C)    Resp 17    Ht 5' 1\" (1.549 m)    Wt 53.5 kg (118 lb)    SpO2 96%    Breastfeeding No    BMI 22.3 kg/m2    Physical Exam:    Gen: Well-developed, well-nourished, in no acute distress  HEENT:  Pink conjunctivae, PERRL, hearing intact to voice, moist mucous membranes  Neck: Supple, without masses, thyroid non-tender  Resp: No accessory muscle use, clear breath sounds without wheezes rales or rhonchi  Card: No murmurs, normal S1, S2 without thrills, bruits or peripheral edema  Abd:  Soft, non-tender, non-distended, normoactive bowel sounds are present, no palpable organomegaly and no detectable hernias  Lymph:  No cervical or inguinal adenopathy  Musc: No cyanosis or clubbing  Skin: No rashes or ulcers, skin turgor is good  Neuro:  Cranial nerves are grossly intact, no focal motor weakness, follows commands appropriately  Psych:  Good insight, oriented to person, place and time, alert         Disposition: home    Patient Instructions: There are no discharge medications for this patient. Activity: Activity as tolerated  Diet: Low fat, Low cholesterol  Wound Care: None needed    Follow-up Information     Follow up With Details Comments Contact Info    Your PCP In 2 weeks            35 minutes were spent on this discharge.     Signed:  Phuogn Soto MD  2/7/2018  1:29 PM

## 2018-02-07 NOTE — PROGRESS NOTES
PIV removed. Discharge instructions given and gone over with PT.  Opportunity to ask questions given

## 2018-02-07 NOTE — PROGRESS NOTES
I met with pt to discuss discharge needs. Pt resides with her  who was discharged yesterday after orthopedic surgery. Pt does not have any identified discharge needs. She does not meet criteria for home health. Pt is okay to discharge from a case management perspective. Pt will follow-up with [facility] @ [address] [place] [address] base. Step-son will transport pt home.   Mely Frye  Team B with Dr Everett Mems  [phone]

## 2018-02-07 NOTE — PROGRESS NOTES
Primary Nurse Nomi Lockhart RN and Edda Navarrete RN performed a dual skin assessment on this patient No impairment noted  Ranjith score is 23

## 2018-04-14 ENCOUNTER — HOSPITAL ENCOUNTER (EMERGENCY)
Age: 58
Discharge: HOME | End: 2018-04-14
Attending: EMERGENCY MEDICINE
Payer: OTHER GOVERNMENT

## 2018-04-14 ENCOUNTER — APPOINTMENT (OUTPATIENT)
Dept: GENERAL RADIOLOGY | Age: 58
End: 2018-04-14
Attending: EMERGENCY MEDICINE
Payer: OTHER GOVERNMENT

## 2018-04-14 VITALS
BODY MASS INDEX: 22.89 KG/M2 | OXYGEN SATURATION: 96 % | RESPIRATION RATE: 16 BRPM | HEIGHT: 61 IN | DIASTOLIC BLOOD PRESSURE: 68 MMHG | HEART RATE: 110 BPM | TEMPERATURE: 98.1 F | WEIGHT: 121.25 LBS | SYSTOLIC BLOOD PRESSURE: 118 MMHG

## 2018-04-14 DIAGNOSIS — S90.31XA CONTUSION OF RIGHT FOOT, INITIAL ENCOUNTER: Primary | ICD-10-CM

## 2018-04-14 PROCEDURE — 73630 X-RAY EXAM OF FOOT: CPT

## 2018-04-14 PROCEDURE — 99282 EMERGENCY DEPT VISIT SF MDM: CPT

## 2018-04-14 RX ORDER — HYDROCODONE BITARTRATE AND ACETAMINOPHEN 5; 325 MG/1; MG/1
1 TABLET ORAL
Status: DISCONTINUED | OUTPATIENT
Start: 2018-04-14 | End: 2018-04-15 | Stop reason: HOSPADM

## 2018-04-14 RX ORDER — HYDROCODONE BITARTRATE AND ACETAMINOPHEN 5; 325 MG/1; MG/1
1 TABLET ORAL
Qty: 10 TAB | Refills: 0 | Status: SHIPPED | OUTPATIENT
Start: 2018-04-14

## 2018-04-14 NOTE — ED TRIAGE NOTES
Foot pain. Was digging on a shelf in the garage for her grandsons carseat and a roll of packing paper rolled off and fell across her foot. Bruising and swelling noted to the top of foot. Taken motrin this morning for pain but nothing else. Iced foot on and off yesterday and today.  No known medication allergies

## 2018-04-15 NOTE — ED PROVIDER NOTES
HPI Comments: 61 y.o. female with past medical history significant for liver disease who presents from home via private vehicle with chief complaint of foot pain. Pt reports that a roll of packing paper fell off of a shelf onto her R foot yesterday morning, and she c/o worsening pain, swelling and bruising since. Pt reports that she cannot bend her toes. Pt has taken OTC pain medication. Pt has had carpal tunnel and elbow surgery in the past. There are no other acute medical concerns at this time. Social hx: +tobacco smoker (0.5 packs/day), +occasional EtOH consumption     Note written by Karie Izquierdo, as dictated by Dmitry Morejon DO 8:08 PM      The history is provided by the patient. No  was used. Past Medical History:   Diagnosis Date    Liver disease 3/2012    lesion on liver/ just watching right now    Nausea & vomiting     severe        Past Surgical History:   Procedure Laterality Date    HX  SECTION  , , 79    HX CHOLECYSTECTOMY      HX HYSTERECTOMY      uterus and other ovary    HX OOPHORECTOMY       left    HX OTHER SURGICAL  2007    tummy tuck         No family history on file. Social History     Social History    Marital status:      Spouse name: N/A    Number of children: N/A    Years of education: N/A     Occupational History    Not on file. Social History Main Topics    Smoking status: Current Some Day Smoker     Packs/day: 0.50     Years: 30.00    Smokeless tobacco: Never Used    Alcohol use Yes      Comment: once every 2 to 3 months    Drug use: No    Sexual activity: Not on file     Other Topics Concern    Not on file     Social History Narrative     ALLERGIES: Review of patient's allergies indicates no known allergies. Review of Systems   Constitutional: Negative for appetite change, chills, fever and unexpected weight change.    HENT: Negative for ear pain, hearing loss, rhinorrhea and trouble swallowing. Eyes: Negative for pain and visual disturbance. Respiratory: Negative for cough, chest tightness and shortness of breath. Cardiovascular: Negative for chest pain and palpitations. Gastrointestinal: Negative for abdominal distention, abdominal pain, blood in stool and vomiting. Genitourinary: Negative for dysuria, hematuria and urgency. Musculoskeletal: Positive for arthralgias and joint swelling. Negative for back pain and myalgias. Skin: Positive for color change. Negative for rash. Neurological: Negative for dizziness, syncope, weakness and numbness. Psychiatric/Behavioral: Negative for confusion and suicidal ideas. All other systems reviewed and are negative. Vitals:    04/14/18 1952   BP: 118/68   Pulse: (!) 110   Resp: 16   Temp: 98.1 °F (36.7 °C)   SpO2: 96%   Weight: 55 kg (121 lb 4.1 oz)   Height: 5' 1\" (1.549 m)            Physical Exam   Constitutional: She is oriented to person, place, and time. She appears well-developed and well-nourished. No distress. HENT:   Head: Normocephalic and atraumatic. Right Ear: External ear normal.   Left Ear: External ear normal.   Nose: Nose normal.   Mouth/Throat: Oropharynx is clear and moist. No oropharyngeal exudate. Eyes: Conjunctivae and EOM are normal. Pupils are equal, round, and reactive to light. Right eye exhibits no discharge. Left eye exhibits no discharge. No scleral icterus. Neck: Normal range of motion. Neck supple. No JVD present. No tracheal deviation present. Cardiovascular: Normal rate, regular rhythm, normal heart sounds and intact distal pulses. Exam reveals no gallop and no friction rub. No murmur heard. Pulmonary/Chest: Effort normal and breath sounds normal. No stridor. No respiratory distress. She has no decreased breath sounds. She has no wheezes. She has no rhonchi. She has no rales. She exhibits no tenderness. Abdominal: Soft. Bowel sounds are normal. She exhibits no distension. There is no tenderness. There is no rebound and no guarding. Musculoskeletal: Normal range of motion. She exhibits tenderness. She exhibits no edema. Right foot: There is tenderness and swelling. There is no deformity. Feet:    Right foot: tenderness to distal metatarsals with ecchymosis and mild swelling. Neurological: She is alert and oriented to person, place, and time. She has normal strength and normal reflexes. No cranial nerve deficit or sensory deficit. She exhibits normal muscle tone. Coordination normal. GCS eye subscore is 4. GCS verbal subscore is 5. GCS motor subscore is 6. Skin: Skin is warm and dry. No rash noted. She is not diaphoretic. No erythema. No pallor. Psychiatric: She has a normal mood and affect. Her behavior is normal. Judgment and thought content normal.   Nursing note and vitals reviewed. Note written by Karie Villarreal, as dictated by No att. providers found 8:23 PM        MDM  Number of Diagnoses or Management Options  Contusion of right foot, initial encounter:      Amount and/or Complexity of Data Reviewed  Tests in the radiology section of CPT®: ordered and reviewed    Risk of Complications, Morbidity, and/or Mortality  Presenting problems: moderate  Diagnostic procedures: low  Management options: low    Patient Progress  Patient progress: stable        ED Course       Procedures    Chief Complaint   Patient presents with    Foot Pain       The patient's presenting problems have been discussed, and they are in agreement with the care plan formulated and outlined with them. I have encouraged them to ask questions as they arise throughout their visit. MEDICATIONS GIVEN:  Medications   HYDROcodone-acetaminophen (NORCO) 5-325 mg per tablet 1 Tab (not administered)       LABS REVIEWED:  No results found for this or any previous visit (from the past 24 hour(s)).     VITAL SIGNS:  Patient Vitals for the past 12 hrs:   Temp Pulse Resp BP SpO2 04/14/18 1952 98.1 °F (36.7 °C) (!) 110 16 118/68 96 %       RADIOLOGY RESULTS:  The following have been ordered and reviewed:  Xr Foot Rt Min 3 V    Result Date: 4/14/2018  EXAM:  XR FOOT RT MIN 3 V INDICATION:   Right foot pain after injury. COMPARISON:  None. FINDINGS:  Three views of the right foot demonstrate no fracture or other acute osseous or articular abnormality. There is a small plantar calcaneal spur. The soft tissues are within normal limits. IMPRESSION:  No acute abnormality. PROGRESS NOTES:  Discussed results and plan with patient. Patient will be discharged home with PCP and ortho foot/ankle followup. Patient instructed to return to the emergency room for any worsening symptoms or any other concerns. DIAGNOSIS:    1. Contusion of right foot, initial encounter        PLAN:  Follow-up Information     Follow up With Details Comments [facility]. MD Liza In 1 week  150 [facility]  [facility] Casimiro Stoner MD In 1 week As needed [address] [address]  [phone]      [facility] EMERGENCY DEPT  If symptoms worsen [address] [place] [address]  [phone]        Discharge Medication List as of 4/14/2018  8:47 PM      START taking these medications    Details   HYDROcodone-acetaminophen (NORCO) 5-325 mg per tablet Take 1 Tab by mouth every four (4) hours as needed for Pain. Max Daily Amount: 6 Tabs., Print, Disp-10 Tab, R-0             ED COURSE: The patient's hospital course has been uncomplicated.

## 2018-04-15 NOTE — DISCHARGE INSTRUCTIONS
We hope that we have addressed all of your medical concerns. The examination and treatment you received in the Emergency Department were for an emergent problem and were not intended as complete care. It is important that you follow up with your healthcare provider(s) for ongoing care. If your symptoms worsen or do not improve as expected, and you are unable to reach your usual health care provider(s), you should return to the Emergency Department. Today's healthcare is undergoing tremendous change, and patient satisfaction surveys are one of the many tools to assess the quality of medical care. You may receive a survey from the etouches regarding your experience in the Emergency Department. I hope that your experience has been completely positive, particularly the medical care that I provided. As such, please participate in the survey; anything less than excellent does not meet my expectations or intentions. [address] and [facility] participate in nationally recognized quality of care measures. If your blood pressure is greater than 120/80, as reported below, we urge that you seek medical care to address the potential of high blood pressure, commonly known as hypertension. Hypertension can be hereditary or can be caused by certain medical conditions, pain, stress, or \"white coat syndrome. \"       Please make an appointment with your health care provider(s) for follow up of your Emergency Department visit. VITALS:   Patient Vitals for the past 8 hrs:   Temp Pulse Resp BP SpO2   04/14/18 1952 98.1 °F (36.7 °C) (!) 110 16 118/68 96 %          Thank you for allowing us to provide you with medical care today. We realize that you have many choices for your emergency care needs. Please choose us in the future for any continued health care needs. [facility], [address] [place] [address] [address] Office: [phone]            No results found for this or any previous visit (from the past 24 hour(s)). Xr Foot Rt Min 3 V    Result Date: 4/14/2018  EXAM:  XR FOOT RT MIN 3 V INDICATION:   Right foot pain after injury. COMPARISON:  None. FINDINGS:  Three views of the right foot demonstrate no fracture or other acute osseous or articular abnormality. There is a small plantar calcaneal spur. The soft tissues are within normal limits. IMPRESSION:  No acute abnormality. Contusion: Care Instructions  Your Care Instructions  Contusion is the medical term for a bruise. It is the result of a direct blow or an impact, such as a fall. Contusions are common sports injuries. Most people think of a bruise as a black-and-blue spot. This happens when small blood vessels get torn and leak blood under the skin. But bones, muscles, and organs can also get bruised. This may damage deep tissues but not cause a bruise you can see. The doctor will do a physical exam to find the location of your contusion. You may also have tests to make sure you do not have a more serious injury, such as a broken bone or nerve damage. These may include X-rays or other imaging tests like a CT scan or MRI. Deep-tissue contusions may cause pain and swelling. But if there is no serious damage, they will often get better in a few weeks with home treatment. The doctor has checked you carefully, but problems can develop later. If you notice any problems or new symptoms, get medical treatment right away. Follow-up care is a key part of your treatment and safety. Be sure to make and go to all appointments, and call your doctor if you are having problems. It's also a good idea to know your test results and keep a list of the medicines you take. How can you care for yourself at home? · Put ice or a cold pack on the sore area for 10 to 20 minutes at a time to stop swelling. Put a thin cloth between the ice pack and your skin.   · Be safe with medicines. Read and follow all instructions on the label. ¨ If the doctor gave you a prescription medicine for pain, take it as prescribed. ¨ If you are not taking a prescription pain medicine, ask your doctor if you can take an over-the-counter medicine. · If you can, prop up the sore area on pillows as much as possible for the next few days. Try to keep the sore area above the level of your heart. When should you call for help? Call your doctor now or seek immediate medical care if:  · Your pain gets worse. · You have new or worse swelling. · You have tingling, weakness, or numbness in the area near the contusion. · The area near the contusion is cold or pale. Watch closely for changes in your health, and be sure to contact your doctor if:  · You do not get better as expected. Where can you learn more? Go to [employer]  Enter [id number] in the search box to learn more about \"Contusion: Care Instructions. \"   © 9273-8723 [employer]. Care instructions adapted under license by [employer] (which disclaims liability or warranty for this information). This care instruction is for use with your licensed healthcare professional. If you have questions about a medical condition or this instruction, always ask your healthcare professional. [address] any warranty or liability for your use of this information.   Content Version: 18.3.940759; Current as of: May 22, 2015